# Patient Record
Sex: MALE | Race: NATIVE HAWAIIAN OR OTHER PACIFIC ISLANDER | NOT HISPANIC OR LATINO | URBAN - METROPOLITAN AREA
[De-identification: names, ages, dates, MRNs, and addresses within clinical notes are randomized per-mention and may not be internally consistent; named-entity substitution may affect disease eponyms.]

---

## 2022-06-21 ENCOUNTER — EMERGENCY (EMERGENCY)
Facility: HOSPITAL | Age: 52
LOS: 1 days | Discharge: HOME | End: 2022-06-21
Attending: EMERGENCY MEDICINE
Payer: SELF-PAY

## 2022-06-21 VITALS
TEMPERATURE: 98 F | DIASTOLIC BLOOD PRESSURE: 106 MMHG | RESPIRATION RATE: 17 BRPM | HEART RATE: 80 BPM | SYSTOLIC BLOOD PRESSURE: 169 MMHG | OXYGEN SATURATION: 98 %

## 2022-06-21 VITALS
WEIGHT: 160.06 LBS | TEMPERATURE: 98 F | HEART RATE: 83 BPM | SYSTOLIC BLOOD PRESSURE: 170 MMHG | DIASTOLIC BLOOD PRESSURE: 97 MMHG | RESPIRATION RATE: 18 BRPM | OXYGEN SATURATION: 98 %

## 2022-06-21 PROCEDURE — 73700 CT LOWER EXTREMITY W/O DYE: CPT | Mod: 26,LT,MF

## 2022-06-21 PROCEDURE — G1004: CPT

## 2022-06-21 PROCEDURE — 73700 CT LOWER EXTREMITY W/O DYE: CPT | Mod: MF

## 2022-06-21 PROCEDURE — 76377 3D RENDER W/INTRP POSTPROCES: CPT | Mod: 26

## 2022-06-21 PROCEDURE — 99285 EMERGENCY DEPT VISIT HI MDM: CPT

## 2022-06-21 PROCEDURE — 73562 X-RAY EXAM OF KNEE 3: CPT | Mod: 26,LT

## 2022-06-21 PROCEDURE — 76377 3D RENDER W/INTRP POSTPROCES: CPT

## 2022-06-21 PROCEDURE — 99284 EMERGENCY DEPT VISIT MOD MDM: CPT | Mod: 25

## 2022-06-21 PROCEDURE — 73562 X-RAY EXAM OF KNEE 3: CPT

## 2022-06-21 RX ORDER — IBUPROFEN 200 MG
400 TABLET ORAL ONCE
Refills: 0 | Status: COMPLETED | OUTPATIENT
Start: 2022-06-21 | End: 2022-06-21

## 2022-06-21 RX ORDER — OXYCODONE HYDROCHLORIDE 5 MG/1
1 TABLET ORAL
Qty: 6 | Refills: 0
Start: 2022-06-21 | End: 2022-06-26

## 2022-06-21 RX ADMIN — Medication 400 MILLIGRAM(S): at 16:18

## 2022-06-21 RX ADMIN — Medication 400 MILLIGRAM(S): at 15:57

## 2022-06-21 NOTE — ED PROVIDER NOTE - CROS ED MUSC ALL NEG
- - - Pain Refusal Text: I offered to prescribe pain medication but the patient refused to take this medication.

## 2022-06-21 NOTE — ED PROVIDER NOTE - PHYSICAL EXAMINATION
Gen: WNWD NAD  HEENT: NCAT  EOMI   Neck: supple  Skin: warm dry intact no skin rash or lac   Ext: wwp, palp DP pulses, slightly dec flexion in L knee, mild suprapatellar swelling, TTP superior patellar pole, patellar tendon intact. FROM in L hip and ankle.   Neuro: A&Ox3, CN grossly intact, sensation intact, motor 5/5 throughout Gen: WNWD NAD  HEENT: NCAT  EOMI   Neck: supple  Skin: warm dry intact no skin rash or lac   Ext: wwp, palp DP pulses, slightly dec flexion in L knee, mild suprapatellar swelling, TTP superior patellar pole, patellar tendon intact. FROM in L hip and ankle.   Neuro: A&Ox3, CN grossly intact, sensation intact, motor 5/5 throughout    Pan Jang, DO PGY-2:  CONSTITUTIONAL: well-appearing, in NAD  SKIN: Warm dry, normal skin turgor  HEAD: NCAT  NECK: Supple; non tender. Full ROM.  CARD: RRR, no murmurs.  RESP: clear to ausculation b/l. No crackles or wheezing.  ABD: soft, non-tender, non-distended, no rebound or guarding.  MSK: L knee tenderness limited ROM 2/2 pain unable to ambulate  PSYCH: Cooperative, appropriate.

## 2022-06-21 NOTE — ED ADULT NURSE NOTE - OBJECTIVE STATEMENT
pt fell injuring left knee  minimal swelling noted and pulses are without deficit  pt can move his toes well

## 2022-06-21 NOTE — ED PROVIDER NOTE - PATIENT PORTAL LINK FT
You can access the FollowMyHealth Patient Portal offered by [facility] by registering at the following website: [email or web address]. By joining [employer]s FollowMyHealth portal, you will also be able to view your health information using other applications (apps) compatible with our system.

## 2022-06-21 NOTE — ED PROVIDER NOTE - ATTENDING CONTRIBUTION TO CARE
52M here with L knee pain after being struck w piece of tile. Ambulating, distal NV intact. Slight dec flexion of L knee due to pain, patellar and quad tendon are palpable and appear intact. Slight suprapatellar swelling. Will obtain XR to r/o fx. Pain control, ice. Likely place in knee immob, crutches and DC w ortho fu.

## 2022-06-21 NOTE — ED PROVIDER NOTE - PROGRESS NOTE DETAILS
VIDA Mead, Attending: sign out from [facility]. Although xray neg, pt barely able to bear weight. Given mech of large heavy tile-CT obtained to eval for occult tibial fx. Negative. Stable for d/c with knee immob, crutches, ortho f/u.

## 2022-06-21 NOTE — ED PROVIDER NOTE - OBJECTIVE STATEMENT
[id number] [employer] ([place] )   52M no PMH/PSH here for eval of L knee pain after large tile fell off the wall and struck him directly in L knee. Pt was able to ambulate afterward. Mild swelling to area. Pain with ambulation. No weakness, numbness, tingling. No laceration or abrasion. No other injuries.

## 2022-06-21 NOTE — ED PROVIDER NOTE - NSFOLLOWUPCLINICS_GEN_ALL_ED_FT
[facility] Orthopedic Surgery  Orthopedic Surgery  [address], [address] & [address] [place] [address]  Phone: [phone]  Fax:     [facility] Sports Medicine  Sports Medicine  [address] [place] [address]  Phone: [phone]  Fax:

## 2022-06-21 NOTE — ED PROVIDER NOTE - NSFOLLOWUPINSTRUCTIONS_ED_ALL_ED_FT
You have a strain of your left knee.     There might an injury to meniscus or ligament.     Fortunately, no fracture or broken bones.    Use the immobilizer and crutches for comfort.    Take ibuprofen or Tylenol for pain.    Use ice if you feel it helps.     Follow up sports medicine or orthopedics if pain persists.     Please return to ER for new or concerning symptoms.